# Patient Record
Sex: FEMALE | Race: WHITE | NOT HISPANIC OR LATINO | Employment: OTHER | URBAN - METROPOLITAN AREA
[De-identification: names, ages, dates, MRNs, and addresses within clinical notes are randomized per-mention and may not be internally consistent; named-entity substitution may affect disease eponyms.]

---

## 2018-04-20 ENCOUNTER — TRANSCRIBE ORDERS (OUTPATIENT)
Dept: PHYSICAL THERAPY | Facility: HOSPITAL | Age: 73
End: 2018-04-20

## 2018-04-20 DIAGNOSIS — M25.551 HIP PAIN, RIGHT: Primary | ICD-10-CM

## 2018-05-08 ENCOUNTER — HOSPITAL ENCOUNTER (OUTPATIENT)
Dept: PHYSICAL THERAPY | Facility: HOSPITAL | Age: 73
Setting detail: THERAPIES SERIES
Discharge: HOME | End: 2018-05-08

## 2018-05-08 DIAGNOSIS — M54.41 ACUTE BILATERAL LOW BACK PAIN WITH RIGHT-SIDED SCIATICA: Primary | ICD-10-CM

## 2018-05-08 PROCEDURE — G8979 MOBILITY GOAL STATUS: HCPCS | Performed by: PHYSICAL THERAPIST

## 2018-05-08 PROCEDURE — 97161 PT EVAL LOW COMPLEX 20 MIN: CPT | Performed by: PHYSICAL THERAPIST

## 2018-05-08 PROCEDURE — 97110 THERAPEUTIC EXERCISES: CPT | Performed by: PHYSICAL THERAPIST

## 2018-05-08 PROCEDURE — G8978 MOBILITY CURRENT STATUS: HCPCS | Performed by: PHYSICAL THERAPIST

## 2018-05-08 NOTE — THERAPY EVALUATION
Outpatient Physical Therapy Ortho Initial Evaluation   [facility]     Patient Name: Micki Mcduffie  : 1945  MRN: [id number]  Today's Date: 2018      Visit Date: 2018    There is no problem list on file for this patient.       No past medical history on file.     No past surgical history on file.    Visit Dx:     ICD-10-CM ICD-9-CM   1. Acute bilateral low back pain with right-sided sciatica M54.41 724.2     724.3     338.19             Patient History     Row Name 18 1100             History    Chief Complaint Pain  -PB      Type of Pain Back pain;Hip pain;Lower Extremity / Leg  -PB      Date Current Problem(s) Began --   1.5-2 months ago  -PB      Brief Description of Current Complaint Pain started in the R hip and now feeling in B hips.  She has pain when she tries to get up from sitting (not dependent on chair or time) and painful walking on the treadmill.  Pain radiates down R leg sometimes to the ankle. No numbness or tingling.  The R leg feels stiff and weak when she gets up. No prior history of back or hip problems. She has history of osteoporosis, breast cancer 2004, HTN. She exercises with  weekly (pain with hip abductor) and treadmill daily.   -PB      Patient/Caregiver Goals Relieve pain  -PB         Pain     Pain Location Hip  -PB      Pain at Present 5  -PB      Pain at Best 0  -PB      Pain at Worst 5  -PB         Fall Risk Assessment    Any falls in the past year: No  -PB         Services    Are you currently receiving Home Health services No  -PB         Daily Activities    Primary Language English  -PB      Are you able to read Yes  -PB      Are you able to write Yes  -PB      How does patient learn best? Reading  -PB      Teaching needs identified Home Exercise Program  -PB      Barriers to learning None  -PB      Pt Participated in POC and Goals Yes  -PB         Safety    Are you being hurt, hit, or frightened by anyone at home or in your life? No  -PB      Are  you being neglected by a caregiver No  -PB        User Key  (r) = Recorded By, (t) = Taken By, (c) = Cosigned By    Initials Name Provider Type    PB Ruthie Maya PT Physical Therapist                PT Ortho     Row Name 05/08/18 1100       Subjective Comments    Subjective Comments Initial evaluation   -PB       Precautions and Contraindications    Precautions/Limitations no known precautions/limitations  -PB       Subjective Pain    Able to rate subjective pain? yes  -PB    Pre-Treatment Pain Level 5  -PB    Post-Treatment Pain Level 5  -PB       Posture/Observations    Lumbar lordosis Decreased  -PB    Iliac crests Normal  -PB       Myotomal Screen- Lower Quarter Clearing    Ankle DF (L4) Bilateral:;5 (Normal)  -PB    Ankle PF (S1) Bilateral:;4 (Good)  -PB       Lumbosacral Palpation    SI Right:;Tender  -PB    Erector Spinae (Paraspinals) Right:;Tender;Guarded/taut  -PB       Lumbar/SI Special Tests    SLR (Neural Tension) Bilateral:;Negative  -PB    SI Compression Test (SI Dysfunction) Bilateral:;Positive  -PB    SI Distraction Test (SI Dysfunction) Bilateral:;Negative  -PB    BATSHEVA (hip vs. SI Dysfunction) Bilateral:;Positive   R more than L side hip stiffness  -PB    [place] Test (SI Dysfunction) Bilateral:;Negative  -PB       General ROM    GENERAL ROM COMMENTS Trunk AROM WFL with some strain L side lower back with extension ROM; R hip stiffness flexion, ER, IR with adduction   -PB       General Assessment (Manual Muscle Testing)    Comment, General Manual Muscle Testing (MMT) Assessment Abdominals 3+/5; hip abductors 4+/5 R, 5/5 L   -PB       Flexibility    Flexibility Tested? --   Tighter R hip  -PB       Lower Extremity Flexibility    Hamstrings Bilateral:;Moderately limited  -PB    Hip Flexors Bilateral:;Moderately limited  -PB    Hip External Rotators Bilateral:;Moderately limited  -PB    Hip Internal Rotators Bilateral:;Moderately limited  -PB       Gait/Stairs Assessment/Training     [place] Level (Gait) independent   Normal on level ground   -PB      User Key  (r) = Recorded By, (t) = Taken By, (c) = Cosigned By    Initials Name Provider Type    PB Ruthie Maya, PT Physical Therapist                      Therapy Education  Education Details: HEP and need for new orthotics   Given: HEP  Program: New  How Provided: Verbal, Demonstration, Written  Provided to: Patient  Level of Understanding: Teach back education performed, Verbalized, Demonstrated           PT OP Goals     Row Name 05/08/18 1200          PT Short Term Goals    STG Date to Achieve 06/19/18  -PB     STG 1 Patient will be independent with HEP for core strength and flexibility   -PB     STG 1 Progress New  -PB     STG 2 Patient will report decreased difficulty with walking by 50% to 30%  -PB     STG 2 Progress New  -PB     STG 3 Patient will report decreased hip pain from 5/10 to 2-3/10 with centralized symptoms   -PB     STG 3 Progress New  -PB        Long Term Goals    LTG Date to Achieve 08/06/18  -PB     LTG 1 Patient will report 10% or less difficulty walking   -PB     LTG 1 Progress New  -PB     LTG 2 Patient will report no regular hip pain   -PB     LTG 2 Progress New  -PB        Time Calculation    PT Goal Re-Cert Due Date 08/06/18  -PB       User Key  (r) = Recorded By, (t) = Taken By, (c) = Cosigned By    Initials Name Provider Type    ABRIL Maya, PT Physical Therapist                PT Assessment/Plan     Row Name 05/08/18 1221          PT Assessment    Functional Limitations Limitations in functional capacity and performance  -PB     Impairments Pain;Muscle strength;Posture;Impaired flexibility  -PB     Assessment Comments Mrs. Mcduffie is a 73 year-old female with 2 month onset of R hip pain radiating down the R leg and more recent evolving pain on the L side. She has no comorbidities or personal factors to impact her POC.  She has signs and symptoms of R L5 radiculopathy that will benefit from back rehab  "exercises and education with modalities as needed.   -PB     Please refer to paper survey for additional self-reported information Yes  -PB     Rehab Potential Good  -PB     Patient/caregiver participated in establishment of treatment plan and goals Yes  -PB     Patient would benefit from skilled therapy intervention Yes  -PB        PT Plan    PT Frequency 1x/week;2x/week  -PB     Predicted Duration of Therapy Intervention (OT Eval) 45-90 days   -PB     Planned CPT's? PT EVAL LOW COMPLEXITY: 67780;PT MANUAL THERAPY EA 15 MIN: 48500;PT HOT OR COLD PACK TREAT MCARE;PT ULTRASOUND EA 15 MIN: 85765;PT NEUROMUSC RE-EDUCATION EA 15 MIN: 85490;PT THER PROC EA 15 MIN: 74380;PT ELECTRICAL STIM UNATTEND: ;PT TRACTION LUMBAR: 07060  -PB       User Key  (r) = Recorded By, (t) = Taken By, (c) = Cosigned By    Initials Name Provider Type    ABRIL Maya, PT Physical Therapist                  Exercises     Row Name 05/08/18 1100             Subjective Comments    Subjective Comments Initial evaluation   -PB         Subjective Pain    Able to rate subjective pain? yes  -PB      Pre-Treatment Pain Level 5  -PB      Post-Treatment Pain Level 5  -PB         Exercise 1    Exercise Name 1 Pelvic tilt 5\"/5; AKTC 20\"/2 B; piriformis stretch 20\"/2 B; 90/90 HS stretching 20\"/2 B issued HEP.  Assessed gait on treadmill with her moving speed up to 3.0 and incline 8% with pain onset L anterior hip rather than R hip and not able to lessen with slowing or stretching.  Discussed how I want her to warm up slower on her home treadmill to assess onset of hip pain.   -PB      Time 1 20   -PB        User Key  (r) = Recorded By, (t) = Taken By, (c) = Cosigned By    Initials Name Provider Type    ABRIL Maya, PT Physical Therapist                        Outcome Measure Options: Patient Specific Functional Scale, Lower Extremity Functional Scale (LEFS)  Lower Extremity Functional Index  Any of your usual work, housework or school " activities: A little bit of difficulty  Your usual hobbies, recreational or sporting activities: A little bit of difficulty  Getting into or out of the bath: No difficulty  Walking between rooms: No difficulty  Putting on your shoes or socks: No difficulty  Squatting: Moderate difficulty  Lifting an object, like a bag of groceries from the floor: No difficulty  Performing light activities around your home: No difficulty  Performing heavy activities around your home: A little bit of difficulty  Getting into or out of a car: A little bit of difficulty  Walking 2 blocks: No difficulty  Walking a mile: No difficulty  Going up or down 10 stairs (about 1 flight of stairs): A little bit of difficulty  Standing for 1 hour: No difficulty  Sitting for 1 hour: No difficulty  Running on even ground: Moderate difficulty  Running on uneven ground: Moderate difficulty  Making sharp turns while running fast: Extreme difficulty or unable to perform activity  Hopping: Extreme difficulty or unable to perform activity  Rolling over in bed: No difficulty  Total: 61  Patient Specific Functional Scale  Activity 1: Walking after sitting   Activity 1 Score: 7  Activity 2: Treadmill walking   Activity 2 Score: 5  # of Actvities Scored?: 2  Total Score: 0  Patient Specific Functional Scale Commetns: 6      Time Calculation:   Start Time: 1201  Stop Time: 1246  Time Calculation (min): 45 min  Total Timed Code Minutes- PT: 45 minute(s)     Therapy Charges for Today     Code Description Service Date Service Provider Modifiers Qty    81134718415 HC PT MOBILITY CURRENT 5/8/2018 Ruthie Maya, PT GP, CL 1    35368193832 HC PT MOBILITY PROJECTED 5/8/2018 Ruthie Maya, PT GP, CI 1    58503787440 HC PT THER PROC EA 15 MIN 5/8/2018 Ruthie Maya PT GP 1    61969295092 HC PT EVAL LOW COMPLEXITY 2 5/8/2018 Ruthie Maya, PT GP 1          PT G-Codes  Outcome Measure Options: Patient Specific Functional Scale, Lower Extremity Functional Scale  (LEFS)  Score: 60% disability for walking self reported   Functional Limitation: Mobility: Walking and moving around  Mobility: Walking and Moving Around Current Status (): At least 60 percent but less than 80 percent impaired, limited or restricted  Mobility: Walking and Moving Around Goal Status (): At least 1 percent but less than 20 percent impaired, limited or restricted         Ruthie Maya, PT  5/8/2018

## 2018-05-11 ENCOUNTER — HOSPITAL ENCOUNTER (OUTPATIENT)
Dept: PHYSICAL THERAPY | Facility: HOSPITAL | Age: 73
Setting detail: THERAPIES SERIES
Discharge: HOME | End: 2018-05-11

## 2018-05-11 DIAGNOSIS — M54.41 ACUTE BILATERAL LOW BACK PAIN WITH RIGHT-SIDED SCIATICA: Primary | ICD-10-CM

## 2018-05-11 PROCEDURE — 97140 MANUAL THERAPY 1/> REGIONS: CPT | Performed by: PHYSICAL THERAPIST

## 2018-05-11 PROCEDURE — 97110 THERAPEUTIC EXERCISES: CPT | Performed by: PHYSICAL THERAPIST

## 2018-05-11 NOTE — THERAPY TREATMENT NOTE
"    Outpatient Physical Therapy Ortho Treatment Note   [facility]     Patient Name: Micki Mcduffie  : 1945  MRN: [id number]  Today's Date: 2018      Visit Date: 2018    Visit Dx:    ICD-10-CM ICD-9-CM   1. Acute bilateral low back pain with right-sided sciatica M54.41 724.2     724.3     338.19       There is no problem list on file for this patient.       No past medical history on file.     No past surgical history on file.                          PT Assessment/Plan     Row Name 18 1346          PT Assessment    Assessment Comments Moderate tightness R lower lumbar and gluteals with increased pain relief walking post manual therapy could be helpful to improve walking tolerance as long as not re-iritated.   -PB       User Key  (r) = Recorded By, (t) = Taken By, (c) = Cosigned By    Initials Name Provider Type    ABRIL Maya, PT Physical Therapist                    Exercises     Row Name 18 1300             Subjective Comments    Subjective Comments About the same.  Steps ups and stairs hurt today with exercise   -PB         Subjective Pain    Able to rate subjective pain? yes  -PB      Pre-Treatment Pain Level 5  -PB      Post-Treatment Pain Level 4  -PB         Exercise 1    Exercise Name 1 Pelvic tilt 5\"/10; abd brace with supine march x 10; bridges x 10; AKTC 20\"/B (stopped L due to anterior hip pain); 90/90 HS stretching 20\"/2 B; piriformis stretching 20\"/2 B.  Assessed walking on track post manual therapy that was less painful in R gluteals.  Discussed modifications to additionally make with her walking.   -PB      Time 1 20  -PB        User Key  (r) = Recorded By, (t) = Taken By, (c) = Cosigned By    Initials Name Provider Type    PB Ruthie Maya, PT Physical Therapist                        Manual Rx (last 36 hours)      Manual Treatments     Row Name 18 1300             Manual Rx 1    Manual Rx 1 Location L anterior hip flexors   -PB      Manual Rx 1 Type STM " through clothing   -PB      Manual Rx 1 Duration 5 min   -PB         Manual Rx 2    Manual Rx 2 Location R lower lumbar and gluteals   -PB      Manual Rx 2 Type STM   -PB      Manual Rx 2 Grade Ligh tto medium pressure   -PB      Manual Rx 2 Duration 20 min   -PB        User Key  (r) = Recorded By, (t) = Taken By, (c) = Cosigned By    Initials Name Provider Type    PB Ruthie Maya, PT Physical Therapist                             Time Calculation:   Start Time: 1300  Stop Time: 1345  Time Calculation (min): 45 min  Total Timed Code Minutes- PT: 45 minute(s)    Therapy Charges for Today     Code Description Service Date Service Provider Modifiers Qty    78636445518 HC PT THER PROC EA 15 MIN 5/11/2018 Ruthie Maya, PT GP 1    38952090343 HC PT MANUAL THERAPY EA 15 MIN 5/11/2018 Ruthie Maya, PT GP 2                    Ruthie Maya, PT  5/11/2018

## 2018-05-16 ENCOUNTER — HOSPITAL ENCOUNTER (OUTPATIENT)
Dept: PHYSICAL THERAPY | Facility: HOSPITAL | Age: 73
Setting detail: THERAPIES SERIES
Discharge: HOME | End: 2018-05-16

## 2018-05-16 DIAGNOSIS — M54.41 ACUTE BILATERAL LOW BACK PAIN WITH RIGHT-SIDED SCIATICA: Primary | ICD-10-CM

## 2018-05-16 PROCEDURE — 97110 THERAPEUTIC EXERCISES: CPT | Performed by: PHYSICAL THERAPIST

## 2018-05-16 PROCEDURE — 97140 MANUAL THERAPY 1/> REGIONS: CPT | Performed by: PHYSICAL THERAPIST

## 2018-05-16 NOTE — THERAPY TREATMENT NOTE
"    Outpatient Physical Therapy Ortho Treatment Note   [facility]     Patient Name: Micki Mcduffie  : 1945  MRN: [id number]  Today's Date: 2018      Visit Date: 2018    Visit Dx:    ICD-10-CM ICD-9-CM   1. Acute bilateral low back pain with right-sided sciatica M54.41 724.2     724.3     338.19       There is no problem list on file for this patient.       No past medical history on file.     No past surgical history on file.                          PT Assessment/Plan     Row Name 18 1633          PT Assessment    Assessment Comments Decreased R hip pain with 4 inch step up post manual therapy shows irritation to gluteals contributing to pain with activating movements including step ups and walking   -PB       User Key  (r) = Recorded By, (t) = Taken By, (c) = Cosigned By    Initials Name Provider Type    PB Ruthie Maya PT Physical Therapist                    Exercises     Row Name 18 1600             Subjective Comments    Subjective Comments Pain with steps and walking starts with some pain then gets better   -PB         Subjective Pain    Able to rate subjective pain? yes  -PB      Pre-Treatment Pain Level 4  -PB      Post-Treatment Pain Level 4  -PB         Exercise 1    Exercise Name 1 Pelvic tilt 5\"/10; supine abd brace with march x 10; 90/90 HS stretch 30\"/2 B; piriformis stretch x 30\"/2 B; SL clams 10/2 B; assessed 4 inch steps ups that is painful up and down on R side.  Standing calf stretching 30\"/2 B.  Added calf stretch and clams to her HEP.   -PB      Time 1 23  -PB        User Key  (r) = Recorded By, (t) = Taken By, (c) = Cosigned By    Initials Name Provider Type    PB Ruthie Maya, PT Physical Therapist                        Manual Rx (last 36 hours)      Manual Treatments     Row Name 18 1500             Manual Rx 1    Manual Rx 1 Location R upper lateral gluteals   -PB      Manual Rx 1 Type STM  -PB      Manual Rx 1 Grade Medium pressure   -PB      " Manual Rx 1 Duration 10 min   -PB         Manual Rx 2    Manual Rx 2 Location R upper gluteals max and med   -PB      Manual Rx 2 Type Intermusclular manual therapy dry needling   -PB      Manual Rx 2 Grade Positive twitch responses   -PB      Manual Rx 2 Duration 10 min  -PB        User Key  (r) = Recorded By, (t) = Taken By, (c) = Cosigned By    Initials Name Provider Type    PB Ruthie Maya, PT Physical Therapist                             Time Calculation:   Start Time: 1345  Stop Time: 1429  Time Calculation (min): 44 min  Total Timed Code Minutes- PT: 43 minute(s)    Therapy Charges for Today     Code Description Service Date Service Provider Modifiers Qty    50662680105 HC PT THER PROC EA 15 MIN 5/16/2018 Ruthie Maya, PT GP 2    [id number] HC PT MANUAL THERAPY EA 15 MIN 5/16/2018 Ruthie Maya, PT GP 1                    Ruthie Maya, PT  5/16/2018

## 2018-05-23 ENCOUNTER — HOSPITAL ENCOUNTER (OUTPATIENT)
Dept: PHYSICAL THERAPY | Facility: HOSPITAL | Age: 73
Setting detail: THERAPIES SERIES
Discharge: HOME | End: 2018-05-23

## 2018-05-23 DIAGNOSIS — M54.41 ACUTE BILATERAL LOW BACK PAIN WITH RIGHT-SIDED SCIATICA: Primary | ICD-10-CM

## 2018-05-23 PROCEDURE — 97110 THERAPEUTIC EXERCISES: CPT | Performed by: PHYSICAL THERAPIST

## 2018-05-23 PROCEDURE — 97140 MANUAL THERAPY 1/> REGIONS: CPT | Performed by: PHYSICAL THERAPIST

## 2018-05-23 NOTE — THERAPY TREATMENT NOTE
Outpatient Physical Therapy Ortho Treatment Note   [facility]     Patient Name: Micki Mcduffie  : 1945  MRN: [id number]  Today's Date: 2018      Visit Date: 2018    Visit Dx:    ICD-10-CM ICD-9-CM   1. Acute bilateral low back pain with right-sided sciatica M54.41 724.2     724.3     338.19       There is no problem list on file for this patient.       No past medical history on file.     No past surgical history on file.                          PT Assessment/Plan     Row Name 18 1333          PT Assessment    Assessment Comments Patient responsed well to decreased pain after last session enabling her to walk with reduced hip pain but appears to have strained R ITB/quad from pushing through new elliptical exercises on older machine   -PB       User Key  (r) = Recorded By, (t) = Taken By, (c) = Cosigned By    Initials Name Provider Type    BARIL Maya, PT Physical Therapist                    Exercises     Row Name 18 1300             Subjective Comments    Subjective Comments Pain is better with stairs and walking in hip but now having pain down right thigh and shin splint.  I started elliptical.  -PB         Subjective Pain    Able to rate subjective pain? yes  -PB      Pre-Treatment Pain Level 4  -PB      Post-Treatment Pain Level 4  -PB         Exercise 1    Exercise Name 1 Patient instructed in stretching ITB and quadriceps options in standing.  Recommend that she stop new elliptical exercise for now and no longer use older machine at home.   -PB      Time 1 8  -PB        User Key  (r) = Recorded By, (t) = Taken By, (c) = Cosigned By    Initials Name Provider Type    ABRIL Maya, PT Physical Therapist                        Manual Rx (last 36 hours)      Manual Treatments     Row Name 18 1300             Manual Rx 1    Manual Rx 1 Location R buttocks and R ITB   -PB      Manual Rx 1 Type Dry needling   -PB      Manual Rx 1 Grade Positive twitch responses    -PB      Manual Rx 1 Duration 20  -PB         Manual Rx 2    Manual Rx 2 Location R ITB and lateral quad and hamstrings   -PB      Manual Rx 2 Type STM   -PB      Manual Rx 2 Grade Light to medium pressure   -PB      Manual Rx 2 Duration 15  -PB        User Key  (r) = Recorded By, (t) = Taken By, (c) = Cosigned By    Initials Name Provider Type    PB Ruthie Maya, PT Physical Therapist                             Time Calculation:   Start Time: 1201  Stop Time: 1245  Time Calculation (min): 44 min  Total Timed Code Minutes- PT: 44 minute(s)    Therapy Charges for Today     Code Description Service Date Service Provider Modifiers Qty    15423718010 HC PT THER PROC EA 15 MIN 5/23/2018 Ruthie Maya, PT GP 1    43140918621 HC PT MANUAL THERAPY EA 15 MIN 5/23/2018 Ruthie Maya, PT GP 1    05668089152 HC PT MANUAL THERAPY EA 15 MIN 5/23/2018 Ruthie Maya, PT GP 1                    Ruthie Maya, PT  5/23/2018

## 2018-05-29 ENCOUNTER — HOSPITAL ENCOUNTER (OUTPATIENT)
Dept: PHYSICAL THERAPY | Facility: HOSPITAL | Age: 73
Setting detail: THERAPIES SERIES
Discharge: HOME | End: 2018-05-29

## 2018-05-29 DIAGNOSIS — M54.41 ACUTE BILATERAL LOW BACK PAIN WITH RIGHT-SIDED SCIATICA: Primary | ICD-10-CM

## 2018-05-29 PROCEDURE — 97140 MANUAL THERAPY 1/> REGIONS: CPT | Performed by: PHYSICAL THERAPIST

## 2018-05-29 PROCEDURE — 97110 THERAPEUTIC EXERCISES: CPT | Performed by: PHYSICAL THERAPIST

## 2018-05-29 NOTE — THERAPY TREATMENT NOTE
"    Outpatient Physical Therapy Ortho Treatment Note   [facility]     Patient Name: Micki Mcduffie  : 1945  MRN: [id number]  Today's Date: 2018      Visit Date: 2018    Visit Dx:    ICD-10-CM ICD-9-CM   1. Acute bilateral low back pain with right-sided sciatica M54.41 724.2     724.3     338.19       There is no problem list on file for this patient.       No past medical history on file.     No past surgical history on file.                          PT Assessment/Plan     Row Name 18 1356          PT Assessment    Assessment Comments Patient very tight around the left side of her lower back and hip to soft tissues and stretching muscles. Feel patient will benefit from longer term stretching exercises.   -PB       User Key  (r) = Recorded By, (t) = Taken By, (c) = Cosigned By    Initials Name Provider Type    ABRIL Maya, PT Physical Therapist                    Exercises     Row Name 18 1300             Subjective Comments    Subjective Comments Pain reported left side of back and around hip  -PB         Subjective Pain    Able to rate subjective pain? yes  -PB      Pre-Treatment Pain Level 4  -PB      Post-Treatment Pain Level 4  -PB         Exercise 1    Exercise Name 1 Pelvic tilt 5\"/10 with marches x 10; KTC R only x 30\"; 90/90 HS stretching R and L x 30\"; piriformis stretching R and L x 30\"; standing quad and ITB stretching B x 30\"; added side bending stretching standing and child's pose stretching with bias to stretch left side.  Discussed trying yoga class cautionsly.  Issued pictures of HEP.  -PB      Time 1 25  -PB        User Key  (r) = Recorded By, (t) = Taken By, (c) = Cosigned By    Initials Name Provider Type    ABRIL Maya, PT Physical Therapist                        Manual Rx (last 36 hours)      Manual Treatments     Row Name 18 1300             Manual Rx 1    Manual Rx 1 Location L quadratus lumborum   -PB      Manual Rx 1 Type STM   -PB      " "Manual Rx 1 Grade Light massage   -PB      Manual Rx 1 Duration 15  -PB         Manual Rx 2    Manual Rx 2 Location Lumbar spine   -PB      Manual Rx 2 Type Lumbar distraction prone through pelvis   -PB      Manual Rx 2 Grade Medium stretching   -PB      Manual Rx 2 Duration 30\"/5  -PB         Manual Rx 3    Manual Rx 3 Location Lower extremity distraction pulling R then L leg  -PB      Manual Rx 3 Grade Medium stretching   -PB      Manual Rx 3 Duration 30\"/2 B  -PB        User Key  (r) = Recorded By, (t) = Taken By, (c) = Cosigned By    Initials Name Provider Type    PB Ruthie Maya, PT Physical Therapist                             Time Calculation:   Start Time: 1303  Stop Time: 1350  Time Calculation (min): 47 min  Total Timed Code Minutes- PT: 45 minute(s)    Therapy Charges for Today     Code Description Service Date Service Provider Modifiers Qty    [id number]  PT THER PROC EA 15 MIN 5/29/2018 Ruthie Maya, PT GP 2    79062981565 HC PT MANUAL THERAPY EA 15 MIN 5/29/2018 Ruthie Maya, PT GP 1                    Ruthie Maya, PT  5/29/2018     "

## 2018-06-06 ENCOUNTER — HOSPITAL ENCOUNTER (OUTPATIENT)
Dept: PHYSICAL THERAPY | Facility: HOSPITAL | Age: 73
Setting detail: THERAPIES SERIES
Discharge: HOME | End: 2018-06-06

## 2018-06-06 PROCEDURE — 97140 MANUAL THERAPY 1/> REGIONS: CPT | Performed by: PHYSICAL THERAPIST

## 2018-06-06 NOTE — THERAPY TREATMENT NOTE
"    Outpatient Physical Therapy Ortho Treatment Note  [facility]     Patient Name: Micki Mcduffie  : 1945  MRN: [id number]  Today's Date: 2018      Visit Date: 2018    Visit Dx:  No diagnosis found.    There is no problem list on file for this patient.       No past medical history on file.     No past surgical history on file.                          PT Assessment/Plan     Row Name 18 1610          PT Assessment    Assessment Comments Patient very reactive to lumbar / gluteals soft tissues that have benefited in short term from soft tissue modalities.   Today worked deeper into soft tissues with increased pain that may show greater depth of muscle involvement to try to resolve.   -PB       User Key  (r) = Recorded By, (t) = Taken By, (c) = Cosigned By    Initials Name Provider Type    PB Ruthie Maya PT Physical Therapist                    Exercises     Row Name 18 1600             Subjective Comments    Subjective Comments Patient reports she felt good for a day and a half after massage and was able to climb stairs without pain   -PB         Subjective Pain    Able to rate subjective pain? yes  -PB      Pre-Treatment Pain Level 4  -PB      Post-Treatment Pain Level 5  -PB         Exercise 1    Exercise Name 1 Passive stretching to B piriformis hip IR and ER 30\"/2 B; reviewed ITB stretching at wall.  Emphasis on continued stretching   -PB      Time 1 4  -PB        User Key  (r) = Recorded By, (t) = Taken By, (c) = Cosigned By    Initials Name Provider Type    PB Ruthie Maya, PT Physical Therapist                        Manual Rx (last 36 hours)      Manual Treatments     Row Name 18 1500             Manual Rx 1    Manual Rx 1 Location B lumbar spine and gluteals   -PB      Manual Rx 1 Type STM and lumbar myofascial distraction   -PB      Manual Rx 1 Grade Light to medium intensity   -PB      Manual Rx 1 Duration 20  -PB         Manual Rx 2    Manual Rx 2 Location B " "piriformis, R gluteus medius   -PB      Manual Rx 2 Type Dry needling   -PB      Manual Rx 2 Grade Positive twitch responses  -PB      Manual Rx 2 Duration 18  -PB         Manual Rx 3    Manual Rx 3 Location Lower extremity distraction pulling R then L leg  -PB      Manual Rx 3 Grade Medium stretching   -PB      Manual Rx 3 Duration 30\"/1 B  -PB        User Key  (r) = Recorded By, (t) = Taken By, (c) = Cosigned By    Initials Name Provider Type    PB Ruthie Maya, PT Physical Therapist                             Time Calculation:   Start Time: 1515  Stop Time: 1600  Time Calculation (min): 45 min  Total Timed Code Minutes- PT: 45 minute(s)    Therapy Charges for Today     Code Description Service Date Service Provider Modifiers Qty    29806210631 HC PT MANUAL THERAPY EA 15 MIN 6/6/2018 Ruthie Maya, PT GP 3                    Ruthie Maya, PT  6/6/2018     "

## 2018-06-12 ENCOUNTER — HOSPITAL ENCOUNTER (OUTPATIENT)
Dept: PHYSICAL THERAPY | Facility: HOSPITAL | Age: 73
Setting detail: THERAPIES SERIES
Discharge: HOME | End: 2018-06-12

## 2018-06-12 DIAGNOSIS — M54.41 ACUTE BILATERAL LOW BACK PAIN WITH RIGHT-SIDED SCIATICA: Primary | ICD-10-CM

## 2018-06-12 PROCEDURE — 97110 THERAPEUTIC EXERCISES: CPT | Performed by: PHYSICAL THERAPIST

## 2018-06-12 PROCEDURE — 97140 MANUAL THERAPY 1/> REGIONS: CPT | Performed by: PHYSICAL THERAPIST

## 2018-06-12 NOTE — THERAPY PROGRESS REPORT/RE-CERT
Outpatient Physical Therapy Ortho Re-Assessment  [facility]     Patient Name: Micki Mcduffie  : 1945  MRN: [id number]  Today's Date: 2018      Visit Date: 2018    There is no problem list on file for this patient.       No past medical history on file.     No past surgical history on file.    Visit Dx:     ICD-10-CM ICD-9-CM   1. Acute bilateral low back pain with right-sided sciatica M54.41 724.2     724.3     338.19                                       PT OP Goals     Row Name 18 1600          PT Short Term Goals    STG Date to Achieve 18  -PB     STG 1 Patient will be independent with HEP for core strength and flexibility   -PB     STG 1 Progress Partially Met  -PB     STG 2 Patient will report decreased difficulty with walking by 50% to 30%  -PB     STG 2 Progress Progressing  -PB     STG 2 Progress Comments Primary difficulty walking stairs   -PB     STG 3 Patient will report decreased hip pain from 5/10 to 2-3/10 with centralized symptoms   -PB     STG 3 Progress Progressing  -PB     STG 3 Progress Comments 4/10   -PB        Long Term Goals    LTG Date to Achieve 18  -PB     LTG 1 Patient will report 10% or less difficulty walking   -PB     LTG 1 Progress Ongoing  -PB     LTG 2 Patient will report no regular hip pain   -PB     LTG 2 Progress Ongoing  -PB     LTG 2 Progress Comments Intermittent   -PB       User Key  (r) = Recorded By, (t) = Taken By, (c) = Cosigned By    Initials Name Provider Type    PB Ruthie Maya, PT Physical Therapist                PT Assessment/Plan     Row Name 18 1639          PT Assessment    Functional Limitations Limitations in functional capacity and performance  -PB     Impairments Pain;Muscle strength;Posture;Impaired flexibility  -PB     Assessment Comments Micki has been seen for 7 physical therapy sessions for primary right gluteal pain.  She has been instructed in stretching exercises and suggested modifications to her  "walking program to lessen stress.  She has received manual therapy to soft tissues in the lower back, hips and legs.  She reports decreasing pain level although pain may still radiate to the hip or knee today just some in the lateral right knee.  She does have a moderate amount of soft tissue tightness that is responding to manual therapy and stretching and able to work into deeper muscles.  Her symptoms are not resolved but improving.   -PB     Please refer to paper survey for additional self-reported information Yes  -PB     Rehab Potential Good  -PB     Patient/caregiver participated in establishment of treatment plan and goals Yes  -PB     Patient would benefit from skilled therapy intervention Yes  -PB        PT Plan    PT Frequency 1x/week  -PB     Predicted Duration of Therapy Intervention (Therapy Eval) 30 days   -PB     Planned CPT's? PT MANUAL THERAPY EA 15 MIN: 60756;PT THER PROC EA 15 MIN: 00497  -PB       User Key  (r) = Recorded By, (t) = Taken By, (c) = Cosigned By    Initials Name Provider Type    PB Ruthie Maya, PT Physical Therapist                  Exercises     Row Name 06/12/18 1600 06/12/18 1500          Subjective Comments    Subjective Comments Some pain in knee climbing stairs today.  I did not walk on treadmill for 4 days while out of town.   -PB  --        Subjective Pain    Able to rate subjective pain? yes  -PB  --     Pre-Treatment Pain Level 3  -PB  --     Post-Treatment Pain Level 3  -PB  --        Total Minutes    37669 - PT Therapeutic Exercise Minutes 10  -PB  --     46390 - PT Manual Therapy Minutes  -- 30  -PB     57393 - OT Manual Therapy Minutes  -- --  -PB        Exercise 1    Exercise Name 1 Passive stretching to R quadriceps, piriformis, hamstrings 30\"/2 all.  Assessed step up ability to 4 inch step with mild R lateral knee/distal ITB pain that is more with 6 inch high step.  ITB stretching performed without stretch felt on the R side. Emphasis on stretching and suggest " reducing treadmill use this week.   -PB  --     Time 1 10  -PB  --       User Key  (r) = Recorded By, (t) = Taken By, (c) = Cosigned By    Initials Name Provider Type    PB Ruthie Maya, PT Physical Therapist                                  Time Calculation:     Therapy Suggested Charges     Code   Minutes Charges    05045 (CPT®) Hc Pt Neuromusc Re Education Ea 15 Min      98569 (CPT®) Hc Pt Ther Proc Ea 15 Min 10 1    11934 (CPT®) Hc Gait Training Ea 15 Min      02662 (CPT®) Hc Pt Therapeutic Act Ea 15 Min      77971 (CPT®) Hc Pt Manual Therapy Ea 15 Min 30 2    73192 (CPT®) Hc Pt Ther Massage- Per 15 Min      42521 (CPT®) Hc Pt Iontophoresis Ea 15 Min      00925 (CPT®) Hc Pt Elec Stim Ea-Per 15 Min      30984 (CPT®) Hc Pt Ultrasound Ea 15 Min      85603 (CPT®) Hc Pt Self Care/Mgmt/Train Ea 15 Min      Total  40 3          Start Time: 1431  Stop Time: 1514  Time Calculation (min): 43 min     Therapy Charges for Today     Code Description Service Date Service Provider Modifiers Qty    50219514438 HC PT THER PROC EA 15 MIN 6/12/2018 Ruthie Maya, PT GP 1    86246673645 HC PT MANUAL THERAPY EA 15 MIN 6/12/2018 Ruthie Maya, PT GP 2                    Ruthie Maya, PT  6/12/2018

## 2018-06-19 ENCOUNTER — HOSPITAL ENCOUNTER (OUTPATIENT)
Dept: PHYSICAL THERAPY | Facility: HOSPITAL | Age: 73
Setting detail: THERAPIES SERIES
Discharge: HOME | End: 2018-06-19

## 2018-06-19 DIAGNOSIS — M54.41 ACUTE BILATERAL LOW BACK PAIN WITH RIGHT-SIDED SCIATICA: Primary | ICD-10-CM

## 2018-06-19 PROCEDURE — 97140 MANUAL THERAPY 1/> REGIONS: CPT | Performed by: PHYSICAL THERAPIST

## 2018-06-19 PROCEDURE — 97110 THERAPEUTIC EXERCISES: CPT | Performed by: PHYSICAL THERAPIST

## 2018-06-19 NOTE — THERAPY TREATMENT NOTE
Outpatient Physical Therapy Ortho Treatment Note  [facility]     Patient Name: Micki Mcduffie  : 1945  MRN: [id number]  Today's Date: 2018      Visit Date: 2018    Visit Dx:    ICD-10-CM ICD-9-CM   1. Acute bilateral low back pain with right-sided sciatica M54.41 724.2     724.3     338.19       There is no problem list on file for this patient.       No past medical history on file.     No past surgical history on file.                          PT Assessment/Plan     Row Name 18 1718          PT Assessment    Assessment Comments Patient getting 2-3 days of pain relief after physical and massage therapy with return to R lateral thigh pain and L anterior hip pain.  Pain also decreased with limited use of Advil.  Patient not able to resolve pain and return to regular use of treadmill.  I suggested some more management options but suggest she return to MD office for further evaluation.   -PB       User Key  (r) = Recorded By, (t) = Taken By, (c) = Cosigned By    Initials Name Provider Type    PB Ruthie Maya, PT Physical Therapist                    Exercises     Row Name 18 1700             Subjective Comments    Subjective Comments Therapy and massage help for 2-3 days.   -PB         Subjective Pain    Able to rate subjective pain? yes  -PB      Pre-Treatment Pain Level 3  -PB      Post-Treatment Pain Level 3  -PB      Subjective Pain Comment Pain R lateral thigh and L anterior hip bone   -PB         Total Minutes    46101 - PT Therapeutic Exercise Minutes 25  -PB      78781 - PT Manual Therapy Minutes 15  -PB         Exercise 1    Exercise Name 1 Time spent with direction patient discussion about her current status and concerns with patient education for her to avoid sleeping on her stomach and good to sleep on sides with pillow; patient to work on stretching HEP two times a day including morning stretching (reviewed key exercises); patient to avoid treadmill and use  elliptical/arc  if they do not cause the same pain.  Recommend patient make follow up appointment with MD to reassess her condition.   -PB        User Key  (r) = Recorded By, (t) = Taken By, (c) = Cosigned By    Initials Name Provider Type    PB Ruthie Maya PT Physical Therapist                        Manual Rx (last 36 hours)      Manual Treatments     Row Name 06/19/18 1700             Total Minutes    28007 - PT Manual Therapy Minutes 15  -PB         Manual Rx 1    Manual Rx 1 Location R lumbar spine, R gluteals  -PB      Manual Rx 1 Type STM   -PB      Manual Rx 1 Grade Medium to deeper pressure intensity   -PB      Manual Rx 1 Duration 15  -PB        User Key  (r) = Recorded By, (t) = Taken By, (c) = Cosigned By    Initials Name Provider Type    ABRIL Maya PT Physical Therapist                             Time Calculation:   Start Time: 1430  Stop Time: 1515  Time Calculation (min): 45 min  Therapy Suggested Charges     Code   Minutes Charges    02529 (CPT®) Hc Pt Neuromusc Re Education Ea 15 Min      14258 (CPT®) Hc Pt Ther Proc Ea 15 Min 25 2    39475 (CPT®) Hc Gait Training Ea 15 Min      46744 (CPT®) Hc Pt Therapeutic Act Ea 15 Min      15644 (CPT®) Hc Pt Manual Therapy Ea 15 Min 15 1    81351 (CPT®) Hc Pt Ther Massage- Per 15 Min      23462 (CPT®) Hc Pt Iontophoresis Ea 15 Min      37827 (CPT®) Hc Pt Elec Stim Ea-Per 15 Min      73686 (CPT®) Hc Pt Ultrasound Ea 15 Min      06843 (CPT®) Hc Pt Self Care/Mgmt/Train Ea 15 Min      Total  40 3        Therapy Charges for Today     Code Description Service Date Service Provider Modifiers Qty    06301929885 HC PT MANUAL THERAPY EA 15 MIN 6/19/2018 Ruthie Maya, PT GP 1    84799824984 HC PT THER PROC EA 15 MIN 6/19/2018 Ruthie Maya, PT GP 2                    Ruthie Maya, PT  6/19/2018

## 2018-06-25 ENCOUNTER — HOSPITAL ENCOUNTER (OUTPATIENT)
Dept: PHYSICAL THERAPY | Facility: HOSPITAL | Age: 73
Setting detail: THERAPIES SERIES
Discharge: HOME | End: 2018-06-25

## 2018-06-25 DIAGNOSIS — M54.41 ACUTE BILATERAL LOW BACK PAIN WITH RIGHT-SIDED SCIATICA: Primary | ICD-10-CM

## 2018-06-25 PROCEDURE — 97140 MANUAL THERAPY 1/> REGIONS: CPT | Performed by: PHYSICAL THERAPIST

## 2018-06-25 PROCEDURE — 97110 THERAPEUTIC EXERCISES: CPT | Performed by: PHYSICAL THERAPIST

## 2018-06-25 NOTE — THERAPY TREATMENT NOTE
Outpatient Physical Therapy Ortho Treatment Note  [facility]     Patient Name: Micki Mcduffie  : 1945  MRN: [id number]  Today's Date: 2018      Visit Date: 2018    Visit Dx:    ICD-10-CM ICD-9-CM   1. Acute bilateral low back pain with right-sided sciatica M54.41 724.2     724.3     338.19       There is no problem list on file for this patient.       No past medical history on file.     No past surgical history on file.                          PT Assessment/Plan     Row Name 18 1630          PT Assessment    Assessment Comments Pain possibly increased today by weather changes.  She is very tight to B gluteals additionally found on L side today.  She may feel less AM pain avoiding sleeping on her stomach but not certain of benefit yet.   -PB       User Key  (r) = Recorded By, (t) = Taken By, (c) = Cosigned By    Initials Name Provider Type    ABRIL Maya PT Physical Therapist                    Exercises     Row Name 18 1600 18 1500          Subjective Comments    Subjective Comments MD contacted me and I told her I wanted to wait another week and work more on stretching   -PB  --        Subjective Pain    Able to rate subjective pain? yes  -PB  --     Pre-Treatment Pain Level 5  -PB  --     Post-Treatment Pain Level 0  -PB  --     Subjective Pain Comment Pain increased today, felt better past 2 days   -PB  --        Total Minutes    81195 - PT Therapeutic Exercise Minutes 10  -PB  --     91669 - PT Manual Therapy Minutes  -- 35  -PB        Exercise 1    Exercise Name 1 Reviewed stretching for piriformis IR hip and added ER hip position.  Added hip flexor stretching.  Tried LTR with leg crossed but pain increased R lateral leg and L anterior hip rotating to the right side.  Issued HEP  -PB  --     Time 1 10  -PB  --       User Key  (r) = Recorded By, (t) = Taken By, (c) = Cosigned By    Initials Name Provider Type    ABRIL Maya PT Physical Therapist                         Manual Rx (last 36 hours)      Manual Treatments     Row Name 06/25/18 1500             Total Minutes    57302 - PT Manual Therapy Minutes 35  -PB         Manual Rx 1    Manual Rx 1 Location L anterior hip around ASIS L gluteals   -PB      Manual Rx 1 Type STM  -PB      Manual Rx 1 Grade Medium to deeper pressure and friction around ASIS  -PB         Manual Rx 2    Manual Rx 2 Location R gluteals   -PB      Manual Rx 2 Type STM  -PB      Manual Rx 2 Grade Medium to deeper pressure   -PB         Manual Rx 3    Manual Rx 3 Location B lower extremity  -PB      Manual Rx 3 Type Manual distraction of lumbar spine through hip   -PB      Manual Rx 3 Grade Medium pull   -PB      Manual Rx 3 Duration x 1 L , x 2 R   -PB        User Key  (r) = Recorded By, (t) = Taken By, (c) = Cosigned By    Initials Name Provider Type    PB Ruthie Maya PT Physical Therapist                             Time Calculation:   Start Time: 1520  Stop Time: 1510  Time Calculation (min): 1430 min  Therapy Suggested Charges     Code   Minutes Charges    36324 (CPT®) Hc Pt Neuromusc Re Education Ea 15 Min      39391 (CPT®) Hc Pt Ther Proc Ea 15 Min 10 1    15117 (CPT®) Hc Gait Training Ea 15 Min      61484 (CPT®) Hc Pt Therapeutic Act Ea 15 Min      16483 (CPT®) Hc Pt Manual Therapy Ea 15 Min 35 2    74018 (CPT®) Hc Pt Ther Massage- Per 15 Min      46333 (CPT®) Hc Pt Iontophoresis Ea 15 Min      76785 (CPT®) Hc Pt Elec Stim Ea-Per 15 Min      97739 (CPT®) Hc Pt Ultrasound Ea 15 Min      67218 (CPT®) Hc Pt Self Care/Mgmt/Train Ea 15 Min      Total  45 3        Therapy Charges for Today     Code Description Service Date Service Provider Modifiers Qty    65948835602 HC PT THER PROC EA 15 MIN 6/25/2018 Ruthie Maya, PT GP 1    [id number] HC PT MANUAL THERAPY EA 15 MIN 6/25/2018 Ruthie Maya, PT GP 2                    Ruthie Maya, PT  6/25/2018

## 2018-07-05 ENCOUNTER — HOSPITAL ENCOUNTER (OUTPATIENT)
Dept: PHYSICAL THERAPY | Facility: HOSPITAL | Age: 73
Setting detail: THERAPIES SERIES
Discharge: HOME | End: 2018-07-05

## 2018-07-05 DIAGNOSIS — M54.41 ACUTE BILATERAL LOW BACK PAIN WITH RIGHT-SIDED SCIATICA: Primary | ICD-10-CM

## 2018-07-05 PROCEDURE — G8978 MOBILITY CURRENT STATUS: HCPCS | Performed by: PHYSICAL THERAPIST

## 2018-07-05 PROCEDURE — G8979 MOBILITY GOAL STATUS: HCPCS | Performed by: PHYSICAL THERAPIST

## 2018-07-05 PROCEDURE — 97140 MANUAL THERAPY 1/> REGIONS: CPT | Performed by: PHYSICAL THERAPIST

## 2018-07-05 NOTE — THERAPY TREATMENT NOTE
Outpatient Physical Therapy Ortho Treatment Note  [facility]     Patient Name: Micki Mcduffie  : 1945  MRN: [id number]  Today's Date: 2018      Visit Date: 2018    Visit Dx:    ICD-10-CM ICD-9-CM   1. Acute bilateral low back pain with right-sided sciatica M54.41 724.2     724.3     338.19       There is no problem list on file for this patient.       No past medical history on file.     No past surgical history on file.                          PT Assessment/Plan     Row Name 18 1638          PT Assessment    Assessment Comments Micki reports decreasing pain allowing her better stair and walking mobility.  Both gluteals still very tight.   -PB       User Key  (r) = Recorded By, (t) = Taken By, (c) = Cosigned By    Initials Name Provider Type    ABRIL Maya, PT Physical Therapist                    Exercises     Row Name 18 1600 18 1500          Subjective Comments    Subjective Comments Patient reports feeling better so she has been able to resume treadmill walking.   -PB  --        Subjective Pain    Able to rate subjective pain? yes  -PB  --     Pre-Treatment Pain Level 3  -PB  --     Post-Treatment Pain Level 3  -PB  --        Total Minutes    19479 - PT Therapeutic Exercise Minutes 5  -PB  --     55664 - PT Manual Therapy Minutes  -- 38  -PB        Exercise 1    Exercise Name 1 Reviewed 2 angles stretching piriformis and standing hip flexor stretching   -PB  --       User Key  (r) = Recorded By, (t) = Taken By, (c) = Cosigned By    Initials Name Provider Type    ABRIL Maya, PT Physical Therapist                        Manual Rx (last 36 hours)      Manual Treatments     Row Name 18 1500             Total Minutes    65567 - PT Manual Therapy Minutes 38  -PB         Manual Rx 1    Manual Rx 1 Location L anterior hip around ASIS L gluteals   -PB      Manual Rx 1 Type STM  -PB      Manual Rx 1 Grade Medium to deeper pressure and friction around ASIS  -PB          Manual Rx 2    Manual Rx 2 Location B gluteals   -PB      Manual Rx 2 Type STM  -PB      Manual Rx 2 Grade Medium to deeper pressure   -PB        User Key  (r) = Recorded By, (t) = Taken By, (c) = Cosigned By    Initials Name Provider Type    PB Ruthie Maya PT Physical Therapist                   Outcome Measure Options: Patient Specific Functional Scale  Patient Specific Functional Scale  Activity 1: Walking after sitting   Activity 1 Score: 5  Activity 2: Walking on treadmill   Activity 2 Score: 5  # of Actvities Scored?: 2  Total Score: 0  Patient Specific Functional Scale Commetns: 5      Time Calculation:   Start Time: 1345  Stop Time: 1428  Time Calculation (min): 43 min  Therapy Suggested Charges     Code   Minutes Charges    02618 (CPT®) Hc Pt Neuromusc Re Education Ea 15 Min      90602 (CPT®) Hc Pt Ther Proc Ea 15 Min 5     25834 (CPT®) Hc Gait Training Ea 15 Min      28989 (CPT®) Hc Pt Therapeutic Act Ea 15 Min      25436 (CPT®) Hc Pt Manual Therapy Ea 15 Min 38 3    89773 (CPT®) Hc Pt Ther Massage- Per 15 Min      31336 (CPT®) Hc Pt Iontophoresis Ea 15 Min      75654 (CPT®) Hc Pt Elec Stim Ea-Per 15 Min      42278 (CPT®) Hc Pt Ultrasound Ea 15 Min      94609 (CPT®) Hc Pt Self Care/Mgmt/Train Ea 15 Min      Total  43 3        Therapy Charges for Today     Code Description Service Date Service Provider Modifiers Qty    52749607175 HC PT MOBILITY CURRENT 7/5/2018 Ruthie Maya, PT GP, CK 1    81323328713 HC PT MOBILITY PROJECTED 7/5/2018 Ruthie Maya, PT GP, CI 1    [id number] HC PT MANUAL THERAPY EA 15 MIN 7/5/2018 Ruthie Maya, PT GP 3          PT G-Codes  Outcome Measure Options: Patient Specific Functional Scale  Score: 50% disability reported for walking   Functional Limitation: Mobility: Walking and moving around  Mobility: Walking and Moving Around Current Status (): At least 40 percent but less than 60 percent impaired, limited or restricted  Mobility: Walking and Moving Around  Goal Status (): At least 1 percent but less than 20 percent impaired, limited or restricted         Ruthie Maya, PT  7/5/2018

## 2018-07-12 ENCOUNTER — HOSPITAL ENCOUNTER (OUTPATIENT)
Dept: PHYSICAL THERAPY | Facility: HOSPITAL | Age: 73
Setting detail: THERAPIES SERIES
Discharge: HOME | End: 2018-07-12

## 2018-07-12 DIAGNOSIS — M54.41 ACUTE BILATERAL LOW BACK PAIN WITH RIGHT-SIDED SCIATICA: Primary | ICD-10-CM

## 2018-07-12 PROCEDURE — 97140 MANUAL THERAPY 1/> REGIONS: CPT | Performed by: PHYSICAL THERAPIST

## 2018-07-12 NOTE — THERAPY PROGRESS REPORT/RE-CERT
Outpatient Physical Therapy Ortho Re-Assessment  [facility]     Patient Name: Micki Mcduffie  : 1945  MRN: [id number]  Today's Date: 2018      Visit Date: 2018    There is no problem list on file for this patient.       No past medical history on file.     No past surgical history on file.    Visit Dx:     ICD-10-CM ICD-9-CM   1. Acute bilateral low back pain with right-sided sciatica M54.41 724.2     724.3     338.19                                       PT OP Goals     Row Name 18 1600          PT Short Term Goals    STG Date to Achieve 18  -PB     STG 1 Patient will be independent with HEP for core strength and flexibility   -PB     STG 1 Progress Met  -PB     STG 2 Patient will report decreased difficulty with walking by 50% to 30%  -PB     STG 2 Progress Progressing  -PB     STG 2 Progress Comments Able to return to treadmill walking   -PB     STG 3 Patient will report decreased hip pain from 5/10 to 2-3/10 with centralized symptoms   -PB     STG 3 Progress Progressing  -PB     STG 3 Progress Comments Reduced symptoms in R leg and R hip  -PB        Long Term Goals    LTG Date to Achieve 10/04/18  -PB     LTG 1 Patient will report 10% or less difficulty walking   -PB     LTG 1 Progress Ongoing  -PB     LTG 2 Patient will report no regular hip pain   -PB     LTG 2 Progress Ongoing  -PB        Time Calculation    PT Goal Re-Cert Due Date 10/10/18  -PB       User Key  (r) = Recorded By, (t) = Taken By, (c) = Cosigned By    Initials Name Provider Type    PB Ruthie Maya, PT Physical Therapist                PT Assessment/Plan     Row Name 18 1628          PT Assessment    Functional Limitations Limitations in functional capacity and performance  -PB     Impairments Pain;Muscle strength;Posture;Impaired flexibility  -PB     Assessment Comments Micki has been seen for 11 physical therapy sessions for lower back and hip pain with radiation to the right lateral thigh and left  anterior hip.  Her pain is increased with stairs and some walking.  She has received manual therapy to the lower back and hip region with instruction in stretching HEP.  She reports slow but steady improvement in her symptoms.  She has been able to resume walking on treadmill for exercise and add elliptical.  She does still report some milder pain on both sides that has responded well to treatment.  Due to this improvement she is interested in continuing conservative therapy management.   -PB     Please refer to paper survey for additional self-reported information Yes  -PB     Rehab Potential Good  -PB     Patient/caregiver participated in establishment of treatment plan and goals Yes  -PB     Patient would benefit from skilled therapy intervention Yes  -PB        PT Plan    PT Frequency 1x/week  -PB     Predicted Duration of Therapy Intervention (Therapy Eval) 30-90 days   -PB     Planned CPT's? PT MANUAL THERAPY EA 15 MIN: 40714;PT THER PROC EA 15 MIN: 95647  -PB       User Key  (r) = Recorded By, (t) = Taken By, (c) = Cosigned By    Initials Name Provider Type    PB Ruthie Maya, PT Physical Therapist                  Exercises     Row Name 07/12/18 1600 07/12/18 1500          Subjective Comments    Subjective Comments Patient reports pain less R side of back and leg and some still in L hip  -PB  --        Subjective Pain    Able to rate subjective pain? yes  -PB  --     Pre-Treatment Pain Level 3  -PB  --     Post-Treatment Pain Level 1  -PB  --        Total Minutes    14742 - PT Manual Therapy Minutes  -- 40  -PB       User Key  (r) = Recorded By, (t) = Taken By, (c) = Cosigned By    Initials Name Provider Type    PB Ruthie Maya, PT Physical Therapist           Manual Rx (last 36 hours)      Manual Treatments     Row Name 07/12/18 1500             Total Minutes    76498 - PT Manual Therapy Minutes 40  -PB         Manual Rx 1    Manual Rx 1 Location L anterior hip around ASIS L gluteals   -PB      Manual  Rx 1 Type STM  -PB      Manual Rx 1 Grade Medium to deeper pressure and friction around ASIS  -PB         Manual Rx 2    Manual Rx 2 Location B gluteals (L more than R side   -PB      Manual Rx 2 Type STM  -PB      Manual Rx 2 Grade Medium to deeper pressure with trigger point pressure   -PB        User Key  (r) = Recorded By, (t) = Taken By, (c) = Cosigned By    Initials Name Provider Type    PB Ruthie Maya, PT Physical Therapist                                Time Calculation:     Therapy Suggested Charges     Code   Minutes Charges    66117 (CPT®) Hc Pt Neuromusc Re Education Ea 15 Min      14051 (CPT®) Hc Pt Ther Proc Ea 15 Min      55146 (CPT®) Hc Gait Training Ea 15 Min      21941 (CPT®) Hc Pt Therapeutic Act Ea 15 Min      32245 (CPT®) Hc Pt Manual Therapy Ea 15 Min 40 3    20131 (CPT®) Hc Pt Ther Massage- Per 15 Min      79982 (CPT®) Hc Pt Iontophoresis Ea 15 Min      20368 (CPT®) Hc Pt Elec Stim Ea-Per 15 Min      33157 (CPT®) Hc Pt Ultrasound Ea 15 Min      79491 (CPT®) Hc Pt Self Care/Mgmt/Train Ea 15 Min      Total  40 3          Start Time: 1355  Stop Time: 1435  Time Calculation (min): 40 min     Therapy Charges for Today     Code Description Service Date Service Provider Modifiers Qty    02598334752 HC PT MANUAL THERAPY EA 15 MIN 7/12/2018 Ruthie Maya, PT GP 3                    Ruthie Maya, PT  7/12/2018

## 2018-07-18 ENCOUNTER — HOSPITAL ENCOUNTER (OUTPATIENT)
Dept: PHYSICAL THERAPY | Facility: HOSPITAL | Age: 73
Setting detail: THERAPIES SERIES
Discharge: HOME | End: 2018-07-18

## 2018-07-18 DIAGNOSIS — M54.41 ACUTE BILATERAL LOW BACK PAIN WITH RIGHT-SIDED SCIATICA: Primary | ICD-10-CM

## 2018-07-18 PROCEDURE — 97140 MANUAL THERAPY 1/> REGIONS: CPT | Performed by: PHYSICAL THERAPIST

## 2018-07-18 NOTE — THERAPY TREATMENT NOTE
Outpatient Physical Therapy Ortho Treatment Note  [facility]     Patient Name: Micki Mcduffie  : 1945  MRN: [id number]  Today's Date: 2018      Visit Date: 2018    Visit Dx:    ICD-10-CM ICD-9-CM   1. Acute bilateral low back pain with right-sided sciatica M54.41 724.2     724.3     338.19       There is no problem list on file for this patient.       No past medical history on file.     No past surgical history on file.                          PT Assessment/Plan     Row Name 18 1747          PT Assessment    Assessment Comments Left ant/lateral hip pain may be contributed by L hip bursitis so recommend patient make appt with ortho MD for assessment and possible injection.   -PB       User Key  (r) = Recorded By, (t) = Taken By, (c) = Cosigned By    Initials Name Provider Type    PB Ruthie Maya, PT Physical Therapist                    Exercises     Row Name 18 1700             Subjective Comments    Subjective Comments Sharp pain increased in L hip after regular treadmill walking yesterday.   -PB         Subjective Pain    Able to rate subjective pain? yes  -PB      Pre-Treatment Pain Level 3  -PB      Post-Treatment Pain Level 1  -PB         Total Minutes    28698 - PT Manual Therapy Minutes 40  -PB        User Key  (r) = Recorded By, (t) = Taken By, (c) = Cosigned By    Initials Name Provider Type    PB Ruthie Maya, PT Physical Therapist                        Manual Rx (last 36 hours)      Manual Treatments     Row Name 18 1700             Total Minutes    11037 - PT Manual Therapy Minutes 40  -PB         Manual Rx 1    Manual Rx 1 Location L anterior hip around ASIS L gluteals   -PB      Manual Rx 1 Type STM  -PB      Manual Rx 1 Grade Medium to deeper pressure and friction around ASIS  -PB         Manual Rx 2    Manual Rx 2 Location B gluteals (L more than R side   -PB      Manual Rx 2 Type STM  -PB      Manual Rx 2 Grade Medium to deeper pressure with trigger  point pressure   -PB        User Key  (r) = Recorded By, (t) = Taken By, (c) = Cosigned By    Initials Name Provider Type    PB Ruthie Maya, PT Physical Therapist              Therapy Education  Education Details: Reduce sleeping on L hip and add ice   Program: New  How Provided: Verbal  Provided to: Patient  Level of Understanding: Verbalized              Time Calculation:   Start Time: 1345  Stop Time: 1430  Time Calculation (min): 45 min  Therapy Suggested Charges     Code   Minutes Charges    98426 (CPT®) Hc Pt Neuromusc Re Education Ea 15 Min      13826 (CPT®) Hc Pt Ther Proc Ea 15 Min      92511 (CPT®) Hc Gait Training Ea 15 Min      43986 (CPT®) Hc Pt Therapeutic Act Ea 15 Min      29625 (CPT®) Hc Pt Manual Therapy Ea 15 Min 40 3    93082 (CPT®) Hc Pt Ther Massage- Per 15 Min      24637 (CPT®) Hc Pt Iontophoresis Ea 15 Min      61879 (CPT®) Hc Pt Elec Stim Ea-Per 15 Min      12787 (CPT®) Hc Pt Ultrasound Ea 15 Min      37841 (CPT®) Hc Pt Self Care/Mgmt/Train Ea 15 Min      Total  40 3        Therapy Charges for Today     Code Description Service Date Service Provider Modifiers Qty    28670711966 HC PT MANUAL THERAPY EA 15 MIN 7/18/2018 Ruthie Maya, PT GP 3                    Ruthie Maya, PT  7/18/2018

## 2018-07-26 ENCOUNTER — HOSPITAL ENCOUNTER (OUTPATIENT)
Dept: PHYSICAL THERAPY | Facility: HOSPITAL | Age: 73
Setting detail: THERAPIES SERIES
Discharge: HOME | End: 2018-07-26

## 2018-07-26 DIAGNOSIS — M54.41 ACUTE BILATERAL LOW BACK PAIN WITH RIGHT-SIDED SCIATICA: Primary | ICD-10-CM

## 2018-07-26 PROCEDURE — 97140 MANUAL THERAPY 1/> REGIONS: CPT | Performed by: PHYSICAL THERAPIST

## 2018-07-26 NOTE — THERAPY TREATMENT NOTE
Outpatient Physical Therapy Ortho Treatment Note  [facility]     Patient Name: Micki Mcduffie  : 1945  MRN: [id number]  Today's Date: 2018      Visit Date: 2018    Visit Dx:    ICD-10-CM ICD-9-CM   1. Acute bilateral low back pain with right-sided sciatica M54.41 724.2     724.3     338.19       There is no problem list on file for this patient.       No past medical history on file.     No past surgical history on file.                          PT Assessment/Plan     Row Name 18 1746          PT Assessment    Assessment Comments Patient reports good but only short pain relief after therapy.  I suggest she make appt with ortho MD for further evaluation along with continued massage therapy after PT.  She is independent with HEP.  -PB       User Key  (r) = Recorded By, (t) = Taken By, (c) = Cosigned By    Initials Name Provider Type    PB Ruthie Maya, PT Physical Therapist                    Exercises     Row Name 18 1700             Subjective Comments    Subjective Comments Patient reports 24 hours pain relief after therapy but not lasting.   -PB         Subjective Pain    Able to rate subjective pain? yes  -PB      Pre-Treatment Pain Level 5  -PB      Post-Treatment Pain Level 1  -PB         Total Minutes    59479 - PT Manual Therapy Minutes 39  -PB         Exercise 1    Exercise Name 1 Reinforced stretching HEP  -PB        User Key  (r) = Recorded By, (t) = Taken By, (c) = Cosigned By    Initials Name Provider Type    PB Ruthie Maya, PT Physical Therapist                        Manual Rx (last 36 hours)      Manual Treatments     Row Name 18 1700             Total Minutes    30022 - PT Manual Therapy Minutes 39  -PB         Manual Rx 1    Manual Rx 1 Location L anterior hip around ASIS L gluteals   -PB      Manual Rx 1 Type STM  -PB      Manual Rx 1 Grade Medium to deeper pressure and friction around ASIS  -PB         Manual Rx 2    Manual Rx 2 Location B gluteals (L  more than R side   -PB      Manual Rx 2 Type STM  -PB      Manual Rx 2 Grade Medium to deeper pressure with trigger point pressure   -PB        User Key  (r) = Recorded By, (t) = Taken By, (c) = Cosigned By    Initials Name Provider Type    PB Ruthie Maya, PT Physical Therapist                             Time Calculation:   Start Time: 1436  Stop Time: 1515  Time Calculation (min): 39 min  Therapy Suggested Charges     Code   Minutes Charges    48757 (CPT®) Hc Pt Neuromusc Re Education Ea 15 Min      11415 (CPT®) Hc Pt Ther Proc Ea 15 Min      75756 (CPT®) Hc Gait Training Ea 15 Min      12982 (CPT®) Hc Pt Therapeutic Act Ea 15 Min      81471 (CPT®) Hc Pt Manual Therapy Ea 15 Min 39 3    58662 (CPT®) Hc Pt Ther Massage- Per 15 Min      69069 (CPT®) Hc Pt Iontophoresis Ea 15 Min      32716 (CPT®) Hc Pt Elec Stim Ea-Per 15 Min      84670 (CPT®) Hc Pt Ultrasound Ea 15 Min      62800 (CPT®) Hc Pt Self Care/Mgmt/Train Ea 15 Min      Total  39 3        Therapy Charges for Today     Code Description Service Date Service Provider Modifiers Qty    26416488200 HC PT MANUAL THERAPY EA 15 MIN 7/26/2018 Ruthie Maya, PT GP 3                    Ruthie Maya, PT  7/26/2018

## 2019-02-11 ENCOUNTER — DOCUMENTATION (OUTPATIENT)
Dept: PHYSICAL THERAPY | Facility: HOSPITAL | Age: 74
End: 2019-02-11

## 2019-02-11 DIAGNOSIS — M54.41 ACUTE BILATERAL LOW BACK PAIN WITH RIGHT-SIDED SCIATICA: Primary | ICD-10-CM

## 2019-02-11 NOTE — THERAPY DISCHARGE NOTE
Outpatient Physical Therapy Discharge Summary         Patient Name: Micki Mcduffie  : 1945  MRN: [id number]    Today's Date: 2019    Visit Dx:    ICD-10-CM ICD-9-CM   1. Acute bilateral low back pain with right-sided sciatica M54.41 724.2     724.3     338.19       PT OP Goals     Row Name 19 1600          PT Short Term Goals    STG Date to Achieve  18  -PB     STG 1  Patient will be independent with HEP for core strength and flexibility   -PB     STG 1 Progress  Met  -PB     STG 2  Patient will report decreased difficulty with walking by 50% to 30%  -PB     STG 2 Progress  Partially Met  -PB     STG 2 Progress Comments  Able to return to treadmill walking   -PB     STG 3  Patient will report decreased hip pain from 5/10 to 2-3/10 with centralized symptoms   -PB     STG 3 Progress  Partially Met  -PB     STG 3 Progress Comments  Reduced intensity   -PB        Long Term Goals    LTG Date to Achieve  10/04/18  -PB     LTG 1  Patient will report 10% or less difficulty walking   -PB     LTG 1 Progress  Not Met  -PB     LTG 2  Patient will report no regular hip pain   -PB     LTG 2 Progress  Not Met  -PB       User Key  (r) = Recorded By, (t) = Taken By, (c) = Cosigned By    Initials Name Provider Type    Ruthie Best, PT Physical Therapist          OP PT Discharge Summary  Date of Discharge: 19  Reason for Discharge: Maximum functional potential achieved, Independent  Outcomes Achieved: Patient able to partially acheive established goals  Discharge Destination: Home with home program  Discharge Instructions/Additional Comments: Micki was seen for 13 physical therapy sessions for lower back and hip pain with radiation to the right lateral thigh and left anterior hip.  Her pain was increased with stairs and some walking.  She received manual therapy to the lower back and hip region with instruction in stretching HEP.  She reportsed slow but steady improvement in her symptoms but not  resolved.  She was able to resume walking on treadmill for exercise and add elliptical.  She still reported some milder pain on both sides that responded well to treatment.  I recommended considering massage therapy or further evaluation.       Time Calculation:        Therapy Suggested Charges     Code   Minutes Charges    None                       Ruthie Maya, PT  2/11/2019

## 2021-05-20 ENCOUNTER — LAB REQUISITION (OUTPATIENT)
Dept: LAB | Facility: HOSPITAL | Age: 76
End: 2021-05-20

## 2021-05-20 DIAGNOSIS — Z00.00 ENCOUNTER FOR GENERAL ADULT MEDICAL EXAMINATION WITHOUT ABNORMAL FINDINGS: ICD-10-CM

## 2021-05-20 PROCEDURE — U0005 INFEC AGEN DETEC AMPLI PROBE: HCPCS | Performed by: SURGERY

## 2021-05-20 PROCEDURE — U0004 COV-19 TEST NON-CDC HGH THRU: HCPCS | Performed by: SURGERY

## 2021-05-21 LAB — SARS-COV-2 ORF1AB RESP QL NAA+PROBE: NOT DETECTED

## 2021-05-25 ENCOUNTER — LAB REQUISITION (OUTPATIENT)
Dept: LAB | Facility: HOSPITAL | Age: 76
End: 2021-05-25

## 2021-05-25 DIAGNOSIS — Z85.3 PERSONAL HISTORY OF MALIGNANT NEOPLASM OF BREAST: ICD-10-CM

## 2021-05-25 DIAGNOSIS — L76.82 OTHER POSTPROCEDURAL COMPLICATIONS OF SKIN AND SUBCUTANEOUS TISSUE: ICD-10-CM

## 2021-05-25 PROCEDURE — 88302 TISSUE EXAM BY PATHOLOGIST: CPT | Performed by: SURGERY

## 2021-05-26 LAB
LAB AP CASE REPORT: NORMAL
LAB AP CLINICAL INFORMATION: NORMAL
PATH REPORT.FINAL DX SPEC: NORMAL
PATH REPORT.GROSS SPEC: NORMAL

## 2025-04-18 ENCOUNTER — OFFICE VISIT (OUTPATIENT)
Dept: ORTHOPEDIC SURGERY | Facility: CLINIC | Age: 80
End: 2025-04-18
Payer: MEDICARE

## 2025-04-18 ENCOUNTER — PREP FOR SURGERY (OUTPATIENT)
Dept: OTHER | Facility: HOSPITAL | Age: 80
End: 2025-04-18
Payer: MEDICARE

## 2025-04-18 VITALS — HEIGHT: 66 IN | BODY MASS INDEX: 25.55 KG/M2 | WEIGHT: 159 LBS | TEMPERATURE: 98.6 F

## 2025-04-18 DIAGNOSIS — M17.11 PRIMARY OSTEOARTHRITIS OF RIGHT KNEE: Primary | ICD-10-CM

## 2025-04-18 DIAGNOSIS — R52 PAIN: Primary | ICD-10-CM

## 2025-04-18 DIAGNOSIS — M17.11 PRIMARY OSTEOARTHRITIS OF RIGHT KNEE: ICD-10-CM

## 2025-04-18 PROCEDURE — 99204 OFFICE O/P NEW MOD 45 MIN: CPT | Performed by: NURSE PRACTITIONER

## 2025-04-18 RX ORDER — PREGABALIN 75 MG/1
150 CAPSULE ORAL ONCE
OUTPATIENT
Start: 2025-04-18 | End: 2025-04-18

## 2025-04-18 RX ORDER — MELOXICAM 15 MG/1
15 TABLET ORAL ONCE
OUTPATIENT
Start: 2025-04-18 | End: 2025-04-18

## 2025-04-18 RX ORDER — TOLTERODINE 4 MG/1
CAPSULE, EXTENDED RELEASE ORAL
COMMUNITY
Start: 2025-01-28

## 2025-04-18 RX ORDER — TRIAMTERENE AND HYDROCHLOROTHIAZIDE 37.5; 25 MG/1; MG/1
1 TABLET ORAL DAILY
COMMUNITY
Start: 2025-01-21

## 2025-04-18 RX ORDER — CHLORHEXIDINE GLUCONATE 500 MG/1
CLOTH TOPICAL 2 TIMES DAILY
OUTPATIENT
Start: 2025-04-18

## 2025-04-18 NOTE — PROGRESS NOTES
"Patient: Micki Mcduffie  YOB: 1945 80 y.o. female  Medical Record Number: [id number]    Chief Complaints:   Chief Complaint   Patient presents with    Right Knee - Initial Evaluation       History of Present Illness:Micki Mcduffie is a 80 y.o. female who presents as a new patient both myself as well as to the practice with complaints of worsening in right knee pain.  The patient has a history of osteoarthritis has had pain for several years, unfortunately her pain has worsened, she has tried and failed all conservative measures including injections, cortisone injections, medications, patient would like to discuss knee replacement surgery    Allergies:   Allergies   Allergen Reactions    Nitrofurantoin Nausea Only    Sulfamethoxazole-Trimethoprim Other (See Comments)     GI symptoms       Medications:   Current Outpatient Medications   Medication Sig Dispense Refill    NON FORMULARY [employer] Probiotics.      tolterodine LA (DETROL LA) 4 MG 24 hr capsule       triamterene-hydrochlorothiazide (MAXZIDE-25) 37.5-25 MG per tablet Take 1 tablet by mouth Daily.       No current facility-administered medications for this visit.         The following portions of the patient's history were reviewed and updated as appropriate: allergies, current medications, past family history, past medical history, past social history, past surgical history and problem list.    Review of Systems:   14 point review of systems was performed. All systems negative except pertinent positives/negatives listed in HPI above    Physical Exam:   Vitals:    04/18/25 1048   Temp: 98.6 °F (37 °C)   Weight: 72.1 kg (159 lb)   Height: 167.6 cm (66\")       General: A and O x 3, ASA, NAD    Skin clear no unusual lesions noted  Right knee patient does have trace amount of effusion noted as well as a palpable Baker's cyst, 160 degrees flexion neutral in extension positive Suzan negative Lockman calf soft and nontender      Radiology:  Xrays " 3views (ap,lateral, sunrise) right knee were ordered and reviewed today secondary to increased pain show bone-on-bone end-stage osteoarthritis with cyst and spur formation.  No compared to views available    Assessment/Plan: End-stage osteoarthritis right knee with increasing pain    Patient and I discussed options including continuing with conservative measures versus total knee replacement, patient would like to proceed with right total knee replacement overnight stay  Continuation of conservative management vs. TKA discussed.  The patient wishes to proceed with total knee replacement.  At this point the patient has failed the full compliment of conservative treatment and stating complete understanding of the risks/benefits/ anternatives wishes to proceed with surgical treatment.    Risk and benefits of surgery were reviewed.  Including, but not limited to, blood clots or pulmonary embolism, anesthesia risk, infection, fracture, skin/leg numbness, persistent pain/crepitance/popping/catching, failure of the implant, need for future surgeries, hematoma, possible nerve or blood vessel injury, need for transfusion, and potential risk of stroke,heart attack or death, among others.  The patient understands and wishes to proceed.     It was explained that if tissue has been repaired or reconstructed, there is also an increased chance of failure which may require further management.  Following the completion of the discussion, the patient expressed understanding of this planned course of care, all their questions were answered and consent will be obtained preoperatively.    Operative Plan: [facility] [employer] Total Knee Replacement an overnight stay with home health rehab       Ana Laura Hurt, APRN  4/18/2025